# Patient Record
Sex: MALE | Race: WHITE
[De-identification: names, ages, dates, MRNs, and addresses within clinical notes are randomized per-mention and may not be internally consistent; named-entity substitution may affect disease eponyms.]

---

## 2019-12-13 ENCOUNTER — HOSPITAL ENCOUNTER (OUTPATIENT)
Dept: HOSPITAL 95 - MHTC | Age: 54
Discharge: HOME | End: 2019-12-13
Attending: INTERNAL MEDICINE
Payer: COMMERCIAL

## 2019-12-13 VITALS — BODY MASS INDEX: 36.11 KG/M2 | WEIGHT: 272.49 LBS | HEIGHT: 72.99 IN

## 2019-12-13 DIAGNOSIS — G47.33: ICD-10-CM

## 2019-12-13 DIAGNOSIS — Z79.899: ICD-10-CM

## 2019-12-13 DIAGNOSIS — Z79.82: ICD-10-CM

## 2019-12-13 DIAGNOSIS — Z99.89: ICD-10-CM

## 2019-12-13 DIAGNOSIS — E78.2: ICD-10-CM

## 2019-12-13 DIAGNOSIS — I10: ICD-10-CM

## 2019-12-13 DIAGNOSIS — I49.3: ICD-10-CM

## 2019-12-13 DIAGNOSIS — Z87.891: ICD-10-CM

## 2019-12-13 DIAGNOSIS — I25.10: Primary | ICD-10-CM

## 2019-12-13 DIAGNOSIS — I44.1: ICD-10-CM

## 2019-12-13 DIAGNOSIS — I45.10: ICD-10-CM

## 2019-12-13 DIAGNOSIS — Z86.73: ICD-10-CM

## 2019-12-13 DIAGNOSIS — Z88.0: ICD-10-CM

## 2019-12-13 DIAGNOSIS — Z79.02: ICD-10-CM

## 2019-12-13 PROCEDURE — B201YZZ PLAIN RADIOGRAPHY OF MULTIPLE CORONARY ARTERIES USING OTHER CONTRAST: ICD-10-PCS | Performed by: INTERNAL MEDICINE

## 2019-12-13 PROCEDURE — C1894 INTRO/SHEATH, NON-LASER: HCPCS

## 2019-12-13 PROCEDURE — 4A023N7 MEASUREMENT OF CARDIAC SAMPLING AND PRESSURE, LEFT HEART, PERCUTANEOUS APPROACH: ICD-10-PCS | Performed by: INTERNAL MEDICINE

## 2019-12-13 PROCEDURE — C1769 GUIDE WIRE: HCPCS

## 2019-12-13 NOTE — NUR
DISCHARGE
PT REMAINED A&OX3 AND DENIED ANY PAIN DURING RECOVERY. R RADIAL SITE-TR BAND
REMOVED-CLOTH DOT AND WHITE BOARD IN PLACE. DICHARGE PAPERWORK GONE OVER WITH
PT AND SPOUSE. PT AND SPOUSE VERBALLY DENIED ANY QUESTIONS ABOUT THE DISCHARGE
EDUCATION AT THIS TIME. PT DRESSED SELF WITH HELP FROM SPOUSE. IV DC'D WITH
TIP IN TACT.  PT UP TO AMBULATE WITH STEADY GAIT. PT WHEELED OUT BY THIS
NURSE.

## 2021-06-22 ENCOUNTER — HOSPITAL ENCOUNTER (EMERGENCY)
Dept: HOSPITAL 95 - ER | Age: 56
Discharge: HOME | End: 2021-06-22
Payer: COMMERCIAL

## 2021-06-22 VITALS — WEIGHT: 240 LBS | BODY MASS INDEX: 31.81 KG/M2 | HEIGHT: 73 IN

## 2021-06-22 DIAGNOSIS — Z79.899: ICD-10-CM

## 2021-06-22 DIAGNOSIS — Z79.82: ICD-10-CM

## 2021-06-22 DIAGNOSIS — S68.627A: ICD-10-CM

## 2021-06-22 DIAGNOSIS — S68.625A: ICD-10-CM

## 2021-06-22 DIAGNOSIS — S68.623A: Primary | ICD-10-CM
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
## 2021-09-21 ENCOUNTER — HOSPITAL ENCOUNTER (OUTPATIENT)
Dept: HOSPITAL 95 - LAB SHORT | Age: 56
Discharge: HOME | End: 2021-09-21
Attending: PHYSICIAN ASSISTANT
Payer: COMMERCIAL

## 2021-09-21 DIAGNOSIS — L57.0: ICD-10-CM

## 2021-09-21 DIAGNOSIS — D22.5: Primary | ICD-10-CM

## 2021-12-28 ENCOUNTER — HOSPITAL ENCOUNTER (OUTPATIENT)
Dept: HOSPITAL 95 - LAB SHORT | Age: 56
Discharge: HOME | End: 2021-12-28
Attending: PHYSICIAN ASSISTANT
Payer: COMMERCIAL

## 2021-12-28 DIAGNOSIS — D48.5: Primary | ICD-10-CM
